# Patient Record
Sex: FEMALE | Race: WHITE | NOT HISPANIC OR LATINO | Employment: PART TIME | URBAN - METROPOLITAN AREA
[De-identification: names, ages, dates, MRNs, and addresses within clinical notes are randomized per-mention and may not be internally consistent; named-entity substitution may affect disease eponyms.]

---

## 2018-10-04 ENCOUNTER — OFFICE VISIT (OUTPATIENT)
Dept: MEDICAL GROUP | Age: 23
End: 2018-10-04
Payer: COMMERCIAL

## 2018-10-04 VITALS
HEIGHT: 65 IN | DIASTOLIC BLOOD PRESSURE: 68 MMHG | BODY MASS INDEX: 35.82 KG/M2 | HEART RATE: 70 BPM | TEMPERATURE: 97.1 F | OXYGEN SATURATION: 97 % | WEIGHT: 215 LBS | SYSTOLIC BLOOD PRESSURE: 118 MMHG

## 2018-10-04 DIAGNOSIS — N89.8 VAGINAL CYST: ICD-10-CM

## 2018-10-04 DIAGNOSIS — E66.9 OBESITY (BMI 35.0-39.9 WITHOUT COMORBIDITY): ICD-10-CM

## 2018-10-04 PROCEDURE — 99203 OFFICE O/P NEW LOW 30 MIN: CPT | Performed by: PHYSICIAN ASSISTANT

## 2018-10-04 ASSESSMENT — PATIENT HEALTH QUESTIONNAIRE - PHQ9: CLINICAL INTERPRETATION OF PHQ2 SCORE: 0

## 2018-10-04 NOTE — PROGRESS NOTES
"CC: Vaginal mass    History of Present Illness: This is a 23 y.o. female new patient who presents today to establish care and discuss the chronic conditions outlined below. This patient previously saw a provider in [place] for primary care. Other specialists include none. Records for all have been requested. This patient has a past medical history significant for obesity.    Vaginal cyst  This is a new problem. The patient reports that she noticed a small cyst on the interior of her vaginal canal, on the right side, about 3 weeks ago. This spontaneously resolved after approximately 1 week.  She notes that she had a similar \"bump\" on the exterior of her labia.  This also resolved.  Neither of these bumps had any pain, redness, or discharge.  She has never had anything like this in the past.  She has no dysuria or dyspareunia.  She does admit to unprotected intercourse, but notes that she has had the same sexual partner for over 4 years and they are monogamous.  She tells me that she did have STD screening done last year, and this was negative.      Patient Active Problem List    Diagnosis Date Noted   • Obesity (BMI 35.0-39.9 without comorbidity) ([facility]) 10/04/2018   • Vaginal cyst 10/04/2018          Additional History:     Not on File    Current medicines (including changes today)  Current Outpatient Prescriptions   Medication Sig Dispense Refill   • Norgestimate-Eth Estradiol (SPRINTEC 28 PO) Take  by mouth.       No current facility-administered medications for this visit.      She  has a past medical history of Vaginal cyst (10/4/2018).  She  has a past surgical history that includes myringotomy.  Social History   Substance Use Topics   • Smoking status: Never Smoker   • Smokeless tobacco: Never Used   • Alcohol use Yes      Comment: occassionally       Family History   Problem Relation Age of Onset   • Heart Disease Father         stents   • Diabetes Father    • Heart Attack Maternal Grandfather    • Cancer " "Neg Hx      Family Status   Relation Status   • Fa (Not Specified)   • MGFa (Not Specified)   • Neg Hx (Not Specified)       Patient Active Problem List    Diagnosis Date Noted   • Obesity (BMI 35.0-39.9 without comorbidity) ([facility]) 10/04/2018   • Vaginal cyst 10/04/2018         Review of Systems:   Constitutional: Negative for fever, chills, unexpected weight change, fatigue, malaise and generalized weakness.   Eyes: Negative for blurred or double vision, eye pain, eye discharge.  ENT: Negative for headaches, hearing changes, ear pain, ear discharge, rhinorrhea, sinus congestion, sore throat, and neck pain.   Respiratory: Negative for cough, sputum production, chest congestion, dyspnea, wheezing, and crackles.   Cardiovascular: Negative for chest pain, palpitations, orthopnea, and bilateral lower extremity edema.   Gastrointestinal: Negative for heartburn, nausea, vomiting, abdominal pain, hematochezia, melena, diarrhea, constipation, and greasy/foul-smelling stools.   Genitourinary: Positive for 2 masses of the vagina that have now resolved.  Negative for dysuria, polyuria, hematuria, pyuria, urgency, frequency and incontinence.  Musculoskeletal: Negative for myalgias, back pain, and joint pain.   Skin: Negative for rash, itching, cyanotic skin color change.   Neurological: Negative for dizziness, tingling, tremors, focal sensory deficit, focal weakness and headaches.   Heme: Does not bruise/bleed easily.    Endocrine: Negative for heat or cold intolerance, polydipsia, polyuria.  Psychiatric/Behavioral: Negative for depression, suicidal or homicidal ideation and memory loss.         Physical Exam:   Vitals: Blood pressure 118/68, pulse 70, temperature 36.2 °C (97.1 °F), temperature source Temporal, height 1.651 m (5' 5\"), weight 97.5 kg (215 lb), SpO2 97 %.  BMI: Body mass index is 35.78 kg/m².  General/Constitutional: Vitals as above, well nourished, well developed female in no acute distress  Head: Head is grossly " normal & atraumatic.  Eyes: Bilateral conjunctivae clear and not injected, bilateral EOMI, bilateral PERRL  ENT: Bilateral external ears grossly normal in appearance, EACs clear & bilateral TMs visualized with appropriate cone of light reflex, hearing grossly intact. External nares normal in appearance and without discharge or bleeding, bilateral turbinates without erythema or edema and without discharge or bleeding. Good dentition, posterior oropharynx without erythema/edema/exudates.  Neck: Neck supple, no masses, neck non-tender to palpation, no thyromegaly/goiter.  Lymph: No adenopathy in anterior/posterior cervical and supra-/infrascapular nodes.   Respiratory: Normal effort, lungs are clear to auscultation in all fields (anterior, lateral, posterior), no wheezing, rhonchi or rales.  Cardiovascular: Regular rate and rhythm without murmurs, gallops or rubs, no bilateral lower extremity edema.  Musculoskeletal: Gait grossly normal & not antalgic, no tenderness to percussion of vertebral processes, no CVAT.  Skin: Warm and dry with no apparent rashes or lesions.  Neuro: Gross motor movement intact in all 4 extremities, gross sensation intact to extremities and trunk, gait grossly normal and not antalgic.  Cranial nerve examination: Pupils equally round and react to light. Extraocular muscles are intact. Visual fields intact. No facial droop. Hearing intact to conversation. Soft palate rises symmetrically bilaterally with uvula midline. Tongue midline and cranial nerve 12 intact. No abnormal facial movements. Resisted shoulder shrug 5/5 bilaterally.  Psych: Judgment grossly appropriate, no apparent depression/anxiety.      Health Maintenance: Due for immunizations, will administer at our next visit    Imaging/Labs:  N/A    Assessment/Plan:  Care has been established  We need baseline labs to establish a clinical profile  We reviewed USPSTF guidelines  Records requests sent to previous care providers  Denies  intimate partner violence    1. Obesity (BMI 35.0-39.9 without comorbidity)  - Patient identified as having weight management issue.  Appropriate orders and counseling given.    2. Vaginal cyst  Unknown cause.  She will return in 2 weeks for Pap and we will examine the area at that time.  She will let me know if she has any recurrence of this problem prior to that visit.      Return in about 2 weeks (around 10/18/2018) for Pap.      Please note that this dictation was created using voice recognition software. I have made every reasonable attempt to correct obvious errors, but I expect that there are errors of grammar and possibly content that I did not discover before finalizing the note.

## 2018-10-04 NOTE — ASSESSMENT & PLAN NOTE
"This is a new problem. The patient reports that she noticed a small cyst on the interior of her vaginal canal, on the right side, about 3 weeks ago. This spontaneously resolved after approximately 1 week.  She notes that she had a similar \"bump\" on the exterior of her labia.  This also resolved.  Neither of these bumps had any pain, redness, or discharge.  She has never had anything like this in the past.  She has no dysuria or dyspareunia.  She does admit to unprotected intercourse, but notes that she has had the same sexual partner for over 4 years and they are monogamous.  She tells me that she did have STD screening done last year, and this was negative.  "

## 2018-10-15 ENCOUNTER — OFFICE VISIT (OUTPATIENT)
Dept: MEDICAL GROUP | Age: 23
End: 2018-10-15
Payer: COMMERCIAL

## 2018-10-15 ENCOUNTER — HOSPITAL ENCOUNTER (OUTPATIENT)
Facility: MEDICAL CENTER | Age: 23
End: 2018-10-15
Attending: PHYSICIAN ASSISTANT
Payer: COMMERCIAL

## 2018-10-15 VITALS
HEIGHT: 65 IN | DIASTOLIC BLOOD PRESSURE: 66 MMHG | OXYGEN SATURATION: 97 % | BODY MASS INDEX: 36.25 KG/M2 | HEART RATE: 71 BPM | WEIGHT: 217.6 LBS | SYSTOLIC BLOOD PRESSURE: 102 MMHG | TEMPERATURE: 97.1 F

## 2018-10-15 DIAGNOSIS — N89.8 VAGINAL CYST: ICD-10-CM

## 2018-10-15 DIAGNOSIS — R39.89 ABNORMAL UROGENITAL DISCHARGE: ICD-10-CM

## 2018-10-15 DIAGNOSIS — Z12.4 PAP SMEAR FOR CERVICAL CANCER SCREENING: ICD-10-CM

## 2018-10-15 DIAGNOSIS — Z20.2 POSSIBLE EXPOSURE TO STD: ICD-10-CM

## 2018-10-15 DIAGNOSIS — Z01.419 WELL FEMALE EXAM WITH ROUTINE GYNECOLOGICAL EXAM: ICD-10-CM

## 2018-10-15 DIAGNOSIS — Z23 NEED FOR VACCINATION: ICD-10-CM

## 2018-10-15 LAB
CANDIDA DNA VAG QL PROBE+SIG AMP: NEGATIVE
G VAGINALIS DNA VAG QL PROBE+SIG AMP: POSITIVE
T VAGINALIS DNA VAG QL PROBE+SIG AMP: NEGATIVE

## 2018-10-15 PROCEDURE — 87491 CHLMYD TRACH DNA AMP PROBE: CPT

## 2018-10-15 PROCEDURE — 90686 IIV4 VACC NO PRSV 0.5 ML IM: CPT | Performed by: PHYSICIAN ASSISTANT

## 2018-10-15 PROCEDURE — 90632 HEPA VACCINE ADULT IM: CPT | Performed by: PHYSICIAN ASSISTANT

## 2018-10-15 PROCEDURE — 90472 IMMUNIZATION ADMIN EACH ADD: CPT | Performed by: PHYSICIAN ASSISTANT

## 2018-10-15 PROCEDURE — 87510 GARDNER VAG DNA DIR PROBE: CPT

## 2018-10-15 PROCEDURE — 87480 CANDIDA DNA DIR PROBE: CPT

## 2018-10-15 PROCEDURE — 87660 TRICHOMONAS VAGIN DIR PROBE: CPT

## 2018-10-15 PROCEDURE — 90471 IMMUNIZATION ADMIN: CPT | Performed by: PHYSICIAN ASSISTANT

## 2018-10-15 PROCEDURE — 88175 CYTOPATH C/V AUTO FLUID REDO: CPT

## 2018-10-15 PROCEDURE — 87591 N.GONORRHOEAE DNA AMP PROB: CPT

## 2018-10-15 PROCEDURE — 99395 PREV VISIT EST AGE 18-39: CPT | Mod: 25 | Performed by: PHYSICIAN ASSISTANT

## 2018-10-15 PROCEDURE — 99000 SPECIMEN HANDLING OFFICE-LAB: CPT | Performed by: PHYSICIAN ASSISTANT

## 2018-10-15 NOTE — PROGRESS NOTES
"S: Roxana Mohamud is a 23 y.o.,female who presents today for her Pap and GYN exam. Her LMP was 10/1/2018. She is still having regular menses. Her form of contraception is oral contraceptives    Vaginal cyst  This is a historical problem.  Today on pelvic exam, no lesions were noted and there is no discomfort associated with the examination.  The patient reports that this problem spontaneously resolved and has not recurred.  We discussed that it could be related to shaving in that area, and she understands.    She is , P:0.    She has not had an Abnormal Pap previously.  Her last Mammogram was done: due at 40 yo.     Her preventative health screens are up to date.  GYN ROS:  normal menses, no abnormal bleeding, pelvic pain or discharge, no breast pain or new or enlarging lumps on self exam    Patient Active Problem List    Diagnosis Date Noted   • Obesity (BMI 35.0-39.9 without comorbidity) ([facility]) 10/04/2018   • Vaginal cyst 10/04/2018     Current Outpatient Prescriptions on File Prior to Visit   Medication Sig Dispense Refill   • Norgestimate-Eth Estradiol (SPRINTEC 28 PO) Take  by mouth.       No current facility-administered medications on file prior to visit.      Social History   Substance Use Topics   • Smoking status: Never Smoker   • Smokeless tobacco: Never Used   • Alcohol use Yes      Comment: occassionally       O: /66 (BP Location: Left arm, Patient Position: Sitting, BP Cuff Size: Adult)   Pulse 71   Temp 36.2 °C (97.1 °F) (Temporal)   Ht 1.651 m (5' 5\")   Wt 98.7 kg (217 lb 9.6 oz)   SpO2 97%   BMI 36.21 kg/m²   Vitals Noted and Reviewed  Vulva: grossly unremarkable, no lesions or masses noted  Vagina: copious thin yellow discharge present, normal color  Cervix: Parous, nonfriable, no surface lesions identified, Pap was performed  Uterus: Normal shape, position and consistency, Retroverted, mobile  Bimanual exam: No uteromegaly, negative chandelier sign, adnexa freely movable and without " enlargements bilaterally  Rectal: not performed    Assessment:  Normal GYN Exam      Plan:   pap smear  return annually or prn    Pap processed and sent to the lab.    Recommend follow up prn

## 2018-10-15 NOTE — ASSESSMENT & PLAN NOTE
This is a historical problem.  Today on pelvic exam, no lesions were noted and there is no discomfort associated with the examination.  The patient reports that this problem spontaneously resolved and has not recurred.  We discussed that it could be related to shaving in that area, and she understands.

## 2018-10-16 DIAGNOSIS — Z20.2 POSSIBLE EXPOSURE TO STD: ICD-10-CM

## 2018-10-16 DIAGNOSIS — Z12.4 PAP SMEAR FOR CERVICAL CANCER SCREENING: ICD-10-CM

## 2018-10-16 DIAGNOSIS — Z01.419 WELL FEMALE EXAM WITH ROUTINE GYNECOLOGICAL EXAM: ICD-10-CM

## 2018-10-16 LAB — CYTOLOGY REG CYTOL: NORMAL

## 2018-10-17 DIAGNOSIS — N76.0 BACTERIAL VAGINITIS: ICD-10-CM

## 2018-10-17 DIAGNOSIS — B96.89 BACTERIAL VAGINITIS: ICD-10-CM

## 2018-10-17 RX ORDER — METRONIDAZOLE 500 MG/1
500 TABLET ORAL 2 TIMES DAILY
Qty: 10 TAB | Refills: 0 | Status: SHIPPED | OUTPATIENT
Start: 2018-10-17 | End: 2018-10-22

## 2018-10-18 LAB
C TRACH DNA GENITAL QL NAA+PROBE: NEGATIVE
N GONORRHOEA DNA GENITAL QL NAA+PROBE: NEGATIVE
SPECIMEN SOURCE: NORMAL

## 2018-10-19 ENCOUNTER — PATIENT MESSAGE (OUTPATIENT)
Dept: MEDICAL GROUP | Age: 23
End: 2018-10-19

## 2018-10-21 NOTE — PATIENT COMMUNICATION
Patient directed to Urgent care at the request of Sean Kirk MD.  MyChart message sent as the office is closed.

## 2018-11-07 ENCOUNTER — PATIENT MESSAGE (OUTPATIENT)
Dept: MEDICAL GROUP | Age: 23
End: 2018-11-07

## 2018-11-08 RX ORDER — NORGESTIMATE AND ETHINYL ESTRADIOL 0.25-0.035
1 KIT ORAL DAILY
Qty: 28 TAB | Refills: 5 | Status: SHIPPED | OUTPATIENT
Start: 2018-11-08